# Patient Record
(demographics unavailable — no encounter records)

---

## 2025-03-18 NOTE — REASON FOR VISIT
[Initial Consultation] : an initial consultation [Formal Caregiver] : formal caregiver [Family Member] : family member [FreeTextEntry2] : Lung adenocarcinoma

## 2025-03-18 NOTE — PHYSICAL EXAM
[Thin] : thin [Normal] : full range of motion and no deformities appreciated [Capable of only limited self care, confined to bed or chair more than 50% of waking hours] : Status 3- Capable of only limited self care, confined to bed or chair more than 50% of waking hours [de-identified] : anicteric [de-identified] : severe scoliosis

## 2025-03-18 NOTE — REVIEW OF SYSTEMS
[Joint Pain] : joint pain [Joint Stiffness] : joint stiffness [Diarrhea: Grade 0] : Diarrhea: Grade 0 [Fever] : no fever [Fatigue] : no fatigue [Recent Change In Weight] : ~T recent weight change [Dysphagia] : no dysphagia [Chest Pain] : no chest pain [Shortness Of Breath] : no shortness of breath [Abdominal Pain] : no abdominal pain [Constipation] : no constipation [Skin Rash] : no skin rash [Anxiety] : no anxiety [Depression] : no depression [Easy Bleeding] : no tendency for easy bleeding [Easy Bruising] : no tendency for easy bruising [Swollen Glands] : no swollen glands [FreeTextEntry9] : lower left sided back pain, non-radiating, scoliosis

## 2025-03-18 NOTE — RESULTS/DATA
[FreeTextEntry1] : Ashli Staples is a 73-year-old F w/ PMH of osteoporosis, GERD, anxiety, left sided hearing loss, and left lung adenocarcinoma, here to re-establish care given imaging concerning for possible osseous metastasis of lung cancer.

## 2025-03-18 NOTE — ADDENDUM
[FreeTextEntry1] : All medical record entries made by the Scribe were at my, Dr. Prosper Brown, direction and personally dictated by me on 03/18/2025. I have reviewed the chart and agree that the record accurately reflects my personal performance of the history, physical exam, assessment and plan. I have also personally directed, reviewed, and agreed with the chart.   I, Osman Velazquez, documented this note as a scribe on behalf of Dr. Prosper Brown on 03/18/2025.

## 2025-03-18 NOTE — HISTORY OF PRESENT ILLNESS
[de-identified] : Ashli Staples is a 73-year-old F w/ PMH of osteoporosis, GERD, anxiety, left sided hearing loss, and lung adenocarcinoma, here to re-establish care given imaging concerning for possible osseous metastasis of her lung cancer.  Ms. Staples is well-known to me from an admission to [facility] for PNA in May, 2021. She was found to have lung mass at that time, biopsy proven NSCLC. Pneumothorax developed after biopsy. She has severe scoliosis and was not felt to be a good candidate for bronchoscopy nor definitive resection. Next gen sequencing on biopsy sample was not completed due to insurance limitations.  She made and cancelled multiple visits with my office in the months since her admission, was not informed of her biopsy results until early 8/2021.  At first refused PET/CT due to the dietary requirements prior to imaging. Ultimately acquiesced and completed in Aug 2021 w/ no evidence of distant metastatic disease, confirms FDG avidity of left lower lobe mass. Clinically stage I disease. Subsequently completed definitive RT w/ Dr. Jamel Portillo, and was lost in follow up, last seen in-office in 12/2021.  In Nov 2024, she was hospitalized at [facility] for worsening back pain. 11/8/25 CT C/A/P showed indeterminant anterior mediastinal soft tissue density lesion measuring 4.9 cm. Follow up MRI Lumbar Spine showed abnormal marrow signal in the RIGHT ilium, LEFT iliac crest, sacrum, inferior and LEFT inferior endplate of L4 and the T11 vertebral body suspicious for osseous metastatic disease. She was again hospitalized at [facility] in late Dec 2024 for her pain, discharged to [facility] and taking Tramadol 50mg BID, an unspecified muscle relaxer and Tylenol PRN which are providing relief. Also using Lidocaine patches.  Classifies back pain as lower left sided and non-radiating. Denies other areas of pain, no difficulty breathing. Endorses normal BMs and urination but poor appetite.  She is a never-smoker.